# Patient Record
Sex: FEMALE | Race: WHITE | NOT HISPANIC OR LATINO | Employment: FULL TIME | URBAN - METROPOLITAN AREA
[De-identification: names, ages, dates, MRNs, and addresses within clinical notes are randomized per-mention and may not be internally consistent; named-entity substitution may affect disease eponyms.]

---
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
## 2021-01-18 ENCOUNTER — NON-PROVIDER VISIT (OUTPATIENT)
Dept: URGENT CARE | Facility: PHYSICIAN GROUP | Age: 21
End: 2021-01-18

## 2021-01-18 DIAGNOSIS — Z11.1 PPD SCREENING TEST: ICD-10-CM

## 2021-01-18 PROCEDURE — 99999 PR NO CHARGE: CPT | Performed by: PHYSICIAN ASSISTANT

## 2021-01-18 PROCEDURE — 86580 TB INTRADERMAL TEST: CPT | Performed by: PHYSICIAN ASSISTANT

## 2021-01-18 NOTE — NON-PROVIDER
Krissy Cole is a 20 y.o. female here for a non-provider visit for PPD placement -- Step 1 of 1    Reason for PPD:  work requirement    1. TB evaluation questionnaire completed by patient? Yes      -  If any answers marked yes did you contact a provider prior to placing? Not Indicated  2.  Patient notified to return to clinic for reading on: 01/20/21 after 8:54 or 01/21/20 before 08:54  3.  PPD Placement documentation completed on TB evaluation questionnaire? Yes  4.  Location of TB evaluation questionnaire filed:  file

## 2021-01-20 ENCOUNTER — NON-PROVIDER VISIT (OUTPATIENT)
Dept: URGENT CARE | Facility: PHYSICIAN GROUP | Age: 21
End: 2021-01-20

## 2021-01-20 LAB — TB WHEAL 3D P 5 TU DIAM: NORMAL MM

## 2021-01-20 NOTE — NON-PROVIDER
Krissy Cole is a 20 y.o. female here for a non-provider visit for PPD reading -- Step 1 of 1.      1.  Resulted in Epic under enter/edit results? Yes   2.  TB evaluation questionnaire scanned into chart and original given to patient?Yes      3. Was induration greater than 0 mm? No.      Routed to PCP? No

## 2021-04-07 ENCOUNTER — OFFICE VISIT (OUTPATIENT)
Dept: MEDICAL GROUP | Facility: PHYSICIAN GROUP | Age: 21
End: 2021-04-07
Payer: COMMERCIAL

## 2021-04-07 VITALS
RESPIRATION RATE: 14 BRPM | SYSTOLIC BLOOD PRESSURE: 98 MMHG | DIASTOLIC BLOOD PRESSURE: 64 MMHG | OXYGEN SATURATION: 98 % | WEIGHT: 125 LBS | TEMPERATURE: 98.5 F | HEART RATE: 65 BPM | HEIGHT: 65 IN | BODY MASS INDEX: 20.83 KG/M2

## 2021-04-07 DIAGNOSIS — L70.9 ACNE, UNSPECIFIED ACNE TYPE: ICD-10-CM

## 2021-04-07 DIAGNOSIS — Z97.5 IUD (INTRAUTERINE DEVICE) IN PLACE: ICD-10-CM

## 2021-04-07 PROCEDURE — 99213 OFFICE O/P EST LOW 20 MIN: CPT | Performed by: PHYSICIAN ASSISTANT

## 2021-04-07 RX ORDER — CLINDAMYCIN PHOSPHATE 10 MG/G
GEL TOPICAL 2 TIMES DAILY
COMMUNITY
End: 2021-04-07 | Stop reason: SDUPTHER

## 2021-04-07 RX ORDER — CLINDAMYCIN PHOSPHATE 10 MG/G
1 GEL TOPICAL 2 TIMES DAILY
Qty: 60 G | Refills: 2 | Status: SHIPPED | OUTPATIENT
Start: 2021-04-07

## 2021-04-07 ASSESSMENT — PATIENT HEALTH QUESTIONNAIRE - PHQ9: CLINICAL INTERPRETATION OF PHQ2 SCORE: 0

## 2021-04-07 NOTE — LETTER
[facility]  Kenisha Bishop P.A.-C.  [address]  [place] [address]  Fax: [phone]   Authorization for Release/Disclosure of   Protected Health Information   Name: KRISSY SALCIDO : 2000 SSN: [id number]   Address: [place] [address] Phone:    [phone] (home)    I authorize the entity listed below to release/disclose the PHI below to:   [facility]/Kenisha Bishop P.A.-C. and Kenisha Bishop P.A.-C.   Provider or Entity Name:  [facility]   Address   City, [facility], [facility]   Phone:  [phone]    Fax:  [phone]   Reason for request: continuity of care   Information to be released:    [  ] LAST COLONOSCOPY,  including any PATH REPORT and follow-up  [  ] LAST FIT/COLOGUARD RESULT [  ] LAST DEXA  [  ] LAST MAMMOGRAM  [  ] LAST PAP  [  ] LAST LABS [  ] RETINA EXAM REPORT  [  ] IMMUNIZATION RECORDS  [X  ] Release all info        I understand and acknowledge that:  * This Authorization may be revoked at any time by you in writing, except if your health information has already been used or disclosed.  * Your health information that will be used or disclosed as a result of you signing this authorization could be re-disclosed by the recipient. If this occurs, your re-disclosed health information may no longer be protected by State or Federal laws.  * You may refuse to sign this Authorization. Your refusal will not affect your ability to obtain treatment.  * This Authorization becomes effective upon signing and will  on (date) __________.      If no date is indicated, this Authorization will  one (1) year from the signature date.    Name: Krissy Salcido    Signature:   Date:     2021       PLEASE FAX REQUESTED RECORDS BACK TO: [phone]

## 2021-04-07 NOTE — PROGRESS NOTES
CC:   Chief Complaint   Patient presents with   • Establish Care   • Acne          HISTORY OF PRESENT ILLNESS: Patient is a 21 y.o. female established patient who presents today to establish care with me and discuss the following issues:      Health Maintenance: Completed  Requesting pap DR. Vargas.   Declines men B vaccine today.   Declines STD screen today.     Acne  Using clindamycin once daily, working well, needs refill today. Tolerates without side effect.     IUD (intrauterine device) in place  Has mirena IUD about 2 years ago. Pap UTD- Dr. Vargas.       Patient Active Problem List    Diagnosis Date Noted   • Acne 04/07/2021   • IUD (intrauterine device) in place 04/07/2021   • Immunity status testing 04/12/2018      Allergies:Patient has no known allergies.    Current Outpatient Medications   Medication Sig Dispense Refill   • clindamycin (CLEOCIN T) 1 % Gel Apply 1 Application topically 2 times a day. 60 g 2     No current facility-administered medications for this visit.       Social History     Tobacco Use   • Smoking status: Never Smoker   • Smokeless tobacco: Never Used   Substance Use Topics   • Alcohol use: Yes     Comment: Harinder    • Drug use: Yes     Social History     Social History Narrative    Will start attending [employer] this fall 2018        Family History   Problem Relation Age of Onset   • Cancer Mother         history of cervical dysplasia   • Hyperlipidemia Father    • Hypertension Father    • Cancer Other         breast- mom's paternal gma    • Cancer Maternal Aunt         maternal aunt colon cancer dx'd age 46   • Diabetes Neg Hx    • Heart Attack Neg Hx    • Stroke Neg Hx    • Thyroid Neg Hx        Review of Systems:    Constitutional: No Fevers, Chills  Eyes: No vision changes  ENT: No hearing changes  Resp: No Shortness of breath  CV: No Chest pain  GI: No Nausea/Vomiting  MSK: No weakness  Skin: No rashes  Neuro: No Headaches  Psych: No Suicidal ideations    All remaining systems reviewed  "and found to be negative, except as stated above.    Exam:    BP (!) 98/64   Pulse 65   Temp 36.9 °C (98.5 °F) (Temporal)   Resp 14   Ht 1.651 m (5' 5\")   Wt 56.7 kg (125 lb)   SpO2 98%  Body mass index is 20.8 kg/m².    General:  Well nourished, well developed female in NAD  HENT: Atraumatic, normocephalic  EYES: Extraocular movements intact  NECK: Supple, FROM  CHEST: No deformities, Equal chest expansion  RESP: Unlabored, no stridor or audible wheeze  HEART: Regular Rate and rhythm.   Extremities: No Clubbing, Cyanosis, or Edema  Skin: Warm/dry, without rashes  Neuro: A/O x 4, due to COVID-19- did not have patient remove face mask to test cranial nerves.  Motor/sensory grossly intact  Psych: Normal behavior, normal affect        Assessment/Plan:  1. Acne, unspecified acne type  We will refill clindamycin for patient today.  Tolerates well without side effect.  2. IUD (intrauterine device) in place    Other orders  - clindamycin (CLEOCIN T) 1 % Gel; Apply 1 Application topically 2 times a day.  Dispense: 60 g; Refill: 2       Follow-up: Return in about 1 year (around 4/7/2022).    Please note that this dictation was created using voice recognition software. I have made every reasonable attempt to correct obvious errors, but I expect that there are errors of grammar and possibly content that I did not discover before finalizing the note.  "

## 2024-11-27 ENCOUNTER — OFFICE VISIT (OUTPATIENT)
Dept: URGENT CARE | Facility: CLINIC | Age: 24
End: 2024-11-27
Payer: COMMERCIAL

## 2024-11-27 VITALS
HEIGHT: 65 IN | DIASTOLIC BLOOD PRESSURE: 70 MMHG | TEMPERATURE: 98.4 F | HEART RATE: 77 BPM | RESPIRATION RATE: 16 BRPM | SYSTOLIC BLOOD PRESSURE: 116 MMHG | BODY MASS INDEX: 21.66 KG/M2 | WEIGHT: 130 LBS | OXYGEN SATURATION: 97 %

## 2024-11-27 DIAGNOSIS — J30.1 SEASONAL ALLERGIC RHINITIS DUE TO POLLEN: ICD-10-CM

## 2024-11-27 DIAGNOSIS — L70.9 ACNE, UNSPECIFIED ACNE TYPE: ICD-10-CM

## 2024-11-27 DIAGNOSIS — J06.9 UPPER RESPIRATORY TRACT INFECTION, UNSPECIFIED TYPE: ICD-10-CM

## 2024-11-27 PROCEDURE — 99213 OFFICE O/P EST LOW 20 MIN: CPT | Performed by: NURSE PRACTITIONER

## 2024-11-27 PROCEDURE — 3074F SYST BP LT 130 MM HG: CPT | Performed by: NURSE PRACTITIONER

## 2024-11-27 PROCEDURE — 3078F DIAST BP <80 MM HG: CPT | Performed by: NURSE PRACTITIONER

## 2024-11-27 RX ORDER — FLUTICASONE PROPIONATE 50 MCG
1 SPRAY, SUSPENSION (ML) NASAL DAILY
Qty: 16 G | Refills: 0 | Status: SHIPPED | OUTPATIENT
Start: 2024-11-27

## 2024-11-27 RX ORDER — BENZONATATE 100 MG/1
100 CAPSULE ORAL 3 TIMES DAILY PRN
Qty: 30 CAPSULE | Refills: 0 | Status: SHIPPED | OUTPATIENT
Start: 2024-11-27 | End: 2024-12-07

## 2024-11-27 RX ORDER — ALBUTEROL SULFATE 90 UG/1
2 INHALANT RESPIRATORY (INHALATION) EVERY 6 HOURS PRN
Qty: 1 EACH | Refills: 1 | Status: SHIPPED | OUTPATIENT
Start: 2024-11-27

## 2024-11-27 RX ORDER — CETIRIZINE HYDROCHLORIDE 10 MG/1
10 TABLET ORAL DAILY
Qty: 30 TABLET | Refills: 0 | Status: SHIPPED | OUTPATIENT
Start: 2024-11-27

## 2024-11-27 RX ORDER — CLINDAMYCIN PHOSPHATE 10 MG/G
1 GEL TOPICAL 2 TIMES DAILY
Qty: 60 G | Refills: 2 | Status: SHIPPED | OUTPATIENT
Start: 2024-11-27

## 2024-11-27 NOTE — PATIENT INSTRUCTIONS
Call [phone] make appt with next available primary care    Ponaris nasal emollient     Allergies, Adult  An allergy is a condition in which the body's defense system (immune system) comes in contact with an allergen and reacts to it. An allergen is anything that causes an allergic reaction. Allergens cause the immune system to make proteins for fighting infections (antibodies). These antibodies cause cells to release chemicals called histamines that set off the symptoms of an allergic reaction.  Allergies often affect the nasal passages (allergic rhinitis), eyes (allergic conjunctivitis), skin (atopic dermatitis), and stomach. Allergies can be mild, moderate, or severe. They cannot spread from person to person. Allergies can develop at any age and may be outgrown.  What are the causes?  This condition is caused by allergens. Common allergens include:  Outdoor allergens, such as pollen, car fumes, and mold.  Indoor allergens, such as dust, smoke, mold, and pet dander.  Other allergens, such as foods, medicines, scents, insect bites or stings, and other skin irritants.  What increases the risk?  You are more likely to develop this condition if you have:  Family members with allergies.  Family members who have any condition that may be caused by allergens, such as asthma. This may make you more likely to have other allergies.  What are the signs or symptoms?  Symptoms of this condition depend on the severity of the allergy.  Mild to moderate symptoms  Runny nose, stuffy nose (nasal congestion), or sneezing.  Itchy mouth, ears, or throat.  A feeling of mucus dripping down the back of your throat (postnasal drip).  Sore throat.  Itchy, red, watery, or puffy eyes.  Skin rash, or itchy, red, swollen areas of skin (hives).  Stomach cramps or bloating.  Severe symptoms  Severe allergies to food, medicine, or insect bites may cause anaphylaxis, which can be life-threatening. Symptoms include:  A red (flushed) face.  Wheezing  or coughing.  Swollen lips, tongue, or mouth.  Tight or swollen throat.  Chest pain or tightness, or rapid heartbeat.  Trouble breathing or shortness of breath.  Pain in the abdomen, vomiting, or diarrhea.  Dizziness or fainting.  How is this diagnosed?  This condition is diagnosed based on your symptoms, your family and medical history, and a physical exam. You may also have tests, including:  Skin tests to see how your skin reacts to allergens that may be causing your symptoms. Tests include:  Skin prick test. For this test, an allergen is introduced to your body through a small opening in the skin.  Intradermal skin test. For this test, a small amount of allergen is injected under the first layer of your skin.  Patch test. For this test, a small amount of allergen is placed on your skin. The area is covered and then checked after a few days.  Blood tests.  A challenge test. For this test, you will eat or breathe in a small amount of allergen to see if you have an allergic reaction.  You may also be asked to:  Keep a food diary. This is a record of all the foods, drinks, and symptoms you have in a day.  Try an elimination diet. To do this:  Remove certain foods from your diet.  Add those foods back one by one to find out if any foods cause an allergic reaction.  How is this treated?         Treatment for allergies depends on your symptoms. Treatment may include:  Cold, wet cloths (cold compresses) to soothe itching and swelling.  Eye drops or nasal sprays.  Nasal irrigation to help clear your mucus or keep the nasal passages moist.  A humidifier to add moisture to the air.  Skin creams to treat rashes or itching.  Oral antihistamines or other medicines to block the reaction or to treat inflammation.  Diet changes to remove foods that cause allergies.  Being exposed again and again to tiny amounts of allergens to help you build a defense against it (tolerance). This is called immunotherapy. Examples  include:  Allergy shot. You receive an injection that contains an allergen.  Sublingual immunotherapy. You take a small dose of allergen under your tongue.  Emergency injection for anaphylaxis. You give yourself a shot using a syringe (auto-injector) that contains the amount of medicine you need. Your health care provider will teach you how to give yourself an injection.  Follow these instructions at home:  Medicines    Take or apply over-the-counter and prescription medicines only as told by your health care provider.  Always carry your auto-injector pen if you are at risk of anaphylaxis. Give yourself an injection as told by your health care provider.  Eating and drinking  Follow instructions from your health care provider about eating or drinking restrictions.  Drink enough fluid to keep your urine pale yellow.  General instructions  Wear a medical alert bracelet or necklace to let others know that you have had anaphylaxis before.  Avoid known allergens whenever possible.  Keep all follow-up visits as told by your health care provider. This is important.  Contact a health care provider if:  Your symptoms do not get better with treatment.  Get help right away if:  You have symptoms of anaphylaxis. These include:  Swollen mouth, tongue, or throat.  Pain or tightness in your chest.  Trouble breathing or shortness of breath.  Dizziness or fainting.  Severe abdominal pain, vomiting, or diarrhea.  These symptoms may represent a serious problem that is an emergency. Do not wait to see if the symptoms will go away. Get medical help right away. Call your local emergency services (911 in the [place] Do not drive yourself to the hospital.  Summary  Take or apply over-the-counter and prescription medicines only as told by your health care provider.  Avoid known allergens when possible.  Always carry your auto-injector pen if you are at risk of anaphylaxis. Give yourself an injection as told by your health care provider.  Wear  a medical alert bracelet or necklace to let others know that you have had anaphylaxis before.  Anaphylaxis is a life-threatening emergency. Get help right away.  This information is not intended to replace advice given to you by your health care provider. Make sure you discuss any questions you have with your health care provider.  Document Revised: 08/16/2021 Document Reviewed: 10/28/2020  [employer] Patient Education © 2023 [employer]  Upper Respiratory Infection, Adult  An upper respiratory infection (URI) is a common viral infection of the nose, throat, and upper air passages that lead to the lungs. The most common type of URI is the common cold. URIs usually get better on their own, without medical treatment.  What are the causes?  A URI is caused by a virus. You may catch a virus by:  Breathing in droplets from an infected person's cough or sneeze.  Touching something that has been exposed to the virus (is contaminated) and then touching your mouth, nose, or eyes.  What increases the risk?  You are more likely to get a URI if:  You are very young or very old.  You have close contact with others, such as at work, school, or a health care facility.  You smoke.  You have long-term (chronic) heart or lung disease.  You have a weakened disease-fighting system (immune system).  You have nasal allergies or asthma.  You are experiencing a lot of stress.  You have poor nutrition.  What are the signs or symptoms?  A URI usually involves some of the following symptoms:  Runny or stuffy (congested) nose.  Cough.  Sneezing.  Sore throat.  Headache.  Fatigue.  Fever.  Loss of appetite.  Pain in your forehead, behind your eyes, and over your cheekbones (sinus pain).  Muscle aches.  Redness or irritation of the eyes.  Pressure in the ears or face.  How is this diagnosed?  This condition may be diagnosed based on your medical history and symptoms, and a physical exam. Your health care provider may use a swab to take a mucus  sample from your nose (nasal swab). This sample can be tested to determine what virus is causing the illness.  How is this treated?  URIs usually get better on their own within 7-10 days. Medicines cannot cure URIs, but your health care provider may recommend certain medicines to help relieve symptoms, such as:  Over-the-counter cold medicines.  Cough suppressants. Coughing is a type of defense against infection that helps to clear the respiratory system, so take these medicines only as recommended by your health care provider.  Fever-reducing medicines.  Follow these instructions at home:  Activity  Rest as needed.  If you have a fever, stay home from work or school until your fever is gone or until your health care provider says your URI cannot spread to other people (is no longer contagious). Your health care provider may have you wear a face mask to prevent your infection from spreading.  Relieving symptoms  Gargle with a mixture of salt and water 3-4 times a day or as needed. To make salt water, completely dissolve ½-1 tsp (3-6 g) of salt in 1 cup (237 mL) of warm water.  Use a cool-mist humidifier to add moisture to the air. This can help you breathe more easily.  Eating and drinking    Drink enough fluid to keep your urine pale yellow.  Eat soups and other clear broths.  General instructions    Take over-the-counter and prescription medicines only as told by your health care provider. These include cold medicines, fever reducers, and cough suppressants.  Do not use any products that contain nicotine or tobacco. These products include cigarettes, chewing tobacco, and vaping devices, such as e-cigarettes. If you need help quitting, ask your health care provider.  Stay away from secondhand smoke.  Stay up to date on all immunizations, including the yearly (annual) flu vaccine.  Keep all follow-up visits. This is important.  How to prevent the spread of infection to others  URIs can be contagious. To prevent the  infection from spreading:  Wash your hands with soap and water for at least 20 seconds. If soap and water are not available, use hand .  Avoid touching your mouth, face, eyes, or nose.  Cough or sneeze into a tissue or your sleeve or elbow instead of into your hand or into the air.    Contact a health care provider if:  You are getting worse instead of better.  You have a fever or chills.  Your mucus is brown or red.  You have yellow or brown discharge coming from your nose.  You have pain in your face, especially when you bend forward.  You have swollen neck glands.  You have pain while swallowing.  You have white areas in the back of your throat.  Get help right away if:  You have shortness of breath that gets worse.  You have severe or persistent:  Headache.  Ear pain.  Sinus pain.  Chest pain.  You have chronic lung disease along with any of the following:  Making high-pitched whistling sounds when you breathe, most often when you breathe out (wheezing).  Prolonged cough (more than 14 days).  Coughing up blood.  A change in your usual mucus.  You have a stiff neck.  You have changes in your:  Vision.  Hearing.  Thinking.  Mood.  These symptoms may be an emergency. Get help right away. Call 911.  Do not wait to see if the symptoms will go away.  Do not drive yourself to the hospital.  Summary  An upper respiratory infection (URI) is a common infection of the nose, throat, and upper air passages that lead to the lungs.  A URI is caused by a virus.  URIs usually get better on their own within 7-10 days.  Medicines cannot cure URIs, but your health care provider may recommend certain medicines to help relieve symptoms.  This information is not intended to replace advice given to you by your health care provider. Make sure you discuss any questions you have with your health care provider.  Document Revised: 07/20/2022 Document Reviewed: 07/20/2022  Elsevier Patient Education © 2023 [employer]

## 2024-11-27 NOTE — PROGRESS NOTES
Verbal consent was acquired by the patient to use [employer] ambient listening note generation during this visit     Date: 11/27/24        Chief Complaint   Patient presents with    Congestion     Head and chest congestion x3days, clindamycin refill          History of Present Illness  The patient presents for evaluation of nasal congestion.    She has been experiencing nasal congestion that has progressed to chest congestion since Sunday, accompanied by chest pain. She finds it difficult to breathe deeply and easily. Her sleep has been disrupted for the past few weeks due to nasal swelling. She started coughing this morning but does not experience postnasal drip when lying down at night. She has no history of asthma, strep throat, or recurrent sinus infections. She has not had severe allergic reactions in the past but believes she may have developed allergies this year.     She occasionally uses an inhaler borrowed from her sister, who has asthma, but has never been prescribed one herself. She started using a humidifier last night, which she believes has been beneficial.    She is taking Cleocin T-Gel for acne.    SOCIAL HISTORY  She is a smoker.         ROS:    No severe shortness of breath   No Cardiac like chest pain, as discussed   As otherwise stated in HPI        Pertinent Medications:   Krissy Cole  No current outpatient medications on file prior to visit.     No current facility-administered medications on file prior to visit.        Allergies:  Krissy Cole Patient has no known allergies.   Problem List:   Krissy Cole does not have any pertinent problems on file.    Surgical History:   Past Surgical History:   Procedure Laterality Date    DENTAL EXTRACTION(S)         Past Social Hx:Krissy Cole  reports that she has never smoked. She has never used smokeless tobacco. She reports that she does not currently use alcohol. She reports current drug use.     Past Family Hx: Krissy  Michelle Cole family history includes Cancer in her maternal aunt, mother, and another family member; Hyperlipidemia in her father; Hypertension in her father.     Problem list, medications, and allergies reviewed by myself today in Epic     Physical Exam:    Vitals:    11/27/24 1341   BP: 116/70   Pulse: 77   Resp: 16   Temp: 36.9 °C (98.4 °F)   SpO2: 97%               Physical Exam  Vitals and nursing note reviewed.   Constitutional:       General: She is not in acute distress.     Appearance: Normal appearance. She is not ill-appearing.   HENT:      Head: Normocephalic and atraumatic.      Right Ear: External ear normal. A middle ear effusion is present. Tympanic membrane is not erythematous.      Left Ear: External ear normal. A middle ear effusion is present. Tympanic membrane is not erythematous.      Nose: Congestion and rhinorrhea present.      Mouth/Throat:      Mouth: Mucous membranes are moist.      Pharynx: Oropharynx is clear. No posterior oropharyngeal erythema.   Eyes:      Extraocular Movements: Extraocular movements intact.      Conjunctiva/sclera: Conjunctivae normal.      Pupils: Pupils are equal, round, and reactive to light.   Cardiovascular:      Rate and Rhythm: Normal rate and regular rhythm.      Pulses: Normal pulses.      Heart sounds: Normal heart sounds.   Pulmonary:      Effort: Pulmonary effort is normal.      Breath sounds: Normal breath sounds. No wheezing, rhonchi or rales.   Musculoskeletal:         General: Normal range of motion.      Cervical back: Normal range of motion and neck supple.   Skin:     General: Skin is warm and dry.   Neurological:      General: No focal deficit present.      Mental Status: She is alert.       Physical Exam      Diagnostics:  Results for orders placed or performed in visit on 01/18/21   PPD Skin Test    Collection Time: 01/18/21  8:54 AM   Result Value Ref Range    Ppd Skin Test       Results      Medical Decision making and plan :  1. Upper  respiratory tract infection, unspecified type  - benzonatate (TESSALON) 100 MG Cap; Take 1 Capsule by mouth 3 times a day as needed for Cough for up to 10 days.  Dispense: 30 Capsule; Refill: 0  - fluticasone (FLONASE) 50 MCG/ACT nasal spray; Administer 1 Spray into affected nostril(S) every day.  Dispense: 16 g; Refill: 0  - cetirizine (ZYRTEC ALLERGY) 10 MG Tab; Take 1 Tablet by mouth every day.  Dispense: 30 Tablet; Refill: 0  - albuterol 108 (90 Base) MCG/ACT Aero Soln inhalation aerosol; Inhale 2 Puffs every 6 hours as needed for Shortness of Breath.  Dispense: 1 Each; Refill: 1    2. Seasonal allergic rhinitis due to pollen  - benzonatate (TESSALON) 100 MG Cap; Take 1 Capsule by mouth 3 times a day as needed for Cough for up to 10 days.  Dispense: 30 Capsule; Refill: 0  - fluticasone (FLONASE) 50 MCG/ACT nasal spray; Administer 1 Spray into affected nostril(S) every day.  Dispense: 16 g; Refill: 0  - cetirizine (ZYRTEC ALLERGY) 10 MG Tab; Take 1 Tablet by mouth every day.  Dispense: 30 Tablet; Refill: 0  - albuterol 108 (90 Base) MCG/ACT Aero Soln inhalation aerosol; Inhale 2 Puffs every 6 hours as needed for Shortness of Breath.  Dispense: 1 Each; Refill: 1    3. Acne, unspecified acne type  - clindamycin 1 % Gel; Apply 1 Application  topically 2 times a day.  Dispense: 60 g; Refill: 2      Pleasant 24 y.o. female presented clinic with:     Assessment & Plan  1. Nasal congestion.  The symptoms suggest a combination of seasonal allergies and a viral infection. A prescription for cough medication was provided. Flonase was recommended for nasal swelling, and Zyrtec was prescribed for allergy symptoms. An inhaler was also prescribed, with instructions to use it every 4 to 6 hours. The use of a humidifier in her room was suggested, with the advice to change the water daily. A nasal emollient, Sonaris, was also recommended to keep the mucous membranes soft and prevent them from drying out and cracking.    2. Chest  congestion.  The chest congestion is likely due to a viral infection. There is no indication for antibiotics at this time. The inhaler was prescribed to help with the heaviness in the chest, with instructions to use it every 4 to 6 hours. If using it every 4 hours, only one inhalation is recommended; if every 6 hours, two inhalations can be used.    3. Medication Management.  A refill for Cleocin T-Gel was requested and provided for acne treatment.    Educated patient on how to use MDI.  Discussed expected side effects and purpose of medication. Verbalized understanding.   I personally reviewed prior external notes and test results pertinent to today's visit. Pt is clinically stable at today's acute urgent care visit.  Patient appears nontoxic with no acute distress noted. Appropriate for outpatient care at this time. Shared decision-making was utilized with patient for treatment plan. Medication discussed included indication for use and the potential benefits and side effects. Education was provided regarding the aforementioned assessments.  Differential Diagnosis, natural history, and supportive care discussed. All of the patient's questions were answered to their satisfaction at the time of discharge. Patient was encouraged to monitor symptoms closely. Those signs and symptoms which would warrant concern and mandate seeking a higher level of service through the emergency department discussed at length.  Patient stated agreement and understanding of this plan of care.    Disposition:  Home in stable condition.      Attestation       Voice Recognition Disclaimer:  Portions of this document were created using voice recognition software and [employer] technology provided by [employer]. The software does have a chance of producing errors of grammar and possibly content. I have made every reasonable attempt to correct obvious errors, but there may be errors of grammar and possibly content that I did not discover before  finalizing the  documentation.    JOSEPH Rubio.